# Patient Record
Sex: MALE | Race: WHITE | NOT HISPANIC OR LATINO | Employment: FULL TIME | URBAN - METROPOLITAN AREA
[De-identification: names, ages, dates, MRNs, and addresses within clinical notes are randomized per-mention and may not be internally consistent; named-entity substitution may affect disease eponyms.]

---

## 2024-01-04 ENCOUNTER — LAB VISIT (OUTPATIENT)
Dept: LAB | Facility: HOSPITAL | Age: 59
End: 2024-01-04
Attending: UROLOGY
Payer: COMMERCIAL

## 2024-01-04 ENCOUNTER — OFFICE VISIT (OUTPATIENT)
Dept: UROLOGY | Facility: CLINIC | Age: 59
End: 2024-01-04
Payer: COMMERCIAL

## 2024-01-04 VITALS
RESPIRATION RATE: 16 BRPM | DIASTOLIC BLOOD PRESSURE: 82 MMHG | SYSTOLIC BLOOD PRESSURE: 139 MMHG | TEMPERATURE: 99 F | BODY MASS INDEX: 25.81 KG/M2 | HEIGHT: 67 IN | HEART RATE: 60 BPM | WEIGHT: 164.44 LBS

## 2024-01-04 DIAGNOSIS — N40.0 BENIGN PROSTATIC HYPERPLASIA, UNSPECIFIED WHETHER LOWER URINARY TRACT SYMPTOMS PRESENT: ICD-10-CM

## 2024-01-04 DIAGNOSIS — Z30.2 STERILIZATION: Primary | ICD-10-CM

## 2024-01-04 LAB — COMPLEXED PSA SERPL-MCNC: 0.93 NG/ML (ref 0–4)

## 2024-01-04 PROCEDURE — 3075F SYST BP GE 130 - 139MM HG: CPT | Mod: CPTII,S$GLB,, | Performed by: UROLOGY

## 2024-01-04 PROCEDURE — 99999 PR PBB SHADOW E&M-NEW PATIENT-LVL III: CPT | Mod: PBBFAC,,, | Performed by: UROLOGY

## 2024-01-04 PROCEDURE — 1159F MED LIST DOCD IN RCRD: CPT | Mod: CPTII,S$GLB,, | Performed by: UROLOGY

## 2024-01-04 PROCEDURE — 3008F BODY MASS INDEX DOCD: CPT | Mod: CPTII,S$GLB,, | Performed by: UROLOGY

## 2024-01-04 PROCEDURE — 84153 ASSAY OF PSA TOTAL: CPT | Performed by: UROLOGY

## 2024-01-04 PROCEDURE — 1160F RVW MEDS BY RX/DR IN RCRD: CPT | Mod: CPTII,S$GLB,, | Performed by: UROLOGY

## 2024-01-04 PROCEDURE — 99204 OFFICE O/P NEW MOD 45 MIN: CPT | Mod: S$GLB,,, | Performed by: UROLOGY

## 2024-01-04 PROCEDURE — 3079F DIAST BP 80-89 MM HG: CPT | Mod: CPTII,S$GLB,, | Performed by: UROLOGY

## 2024-01-04 PROCEDURE — 36415 COLL VENOUS BLD VENIPUNCTURE: CPT | Mod: PN | Performed by: UROLOGY

## 2024-01-04 RX ORDER — DIAZEPAM 5 MG/1
5 TABLET ORAL ONCE
Qty: 1 TABLET | Refills: 0 | Status: SHIPPED | OUTPATIENT
Start: 2024-01-04 | End: 2024-01-04

## 2024-01-04 NOTE — PROGRESS NOTES
HPI:    Mr. Huff is a 58 y.o. male who presents for evaluation re: vasectomy. He has 4 children and he is certain that he desires no more. He is aware of other forms of contraception. He is aware that vasectomy is to be considered permanent/irreversible.    He does not get routine healthcare.  He has never had a PSA or prostate screen.  He is minimal to no lower urinary tract symptoms.    PATIENT HISTORY:    History reviewed. No pertinent past medical history.    Past Surgical History:   Procedure Laterality Date    EYE SURGERY         Family History   Problem Relation Age of Onset    Cancer Father     Heart disease Father     Hypertension Father     Cancer Mother     Heart disease Mother     Hypertension Mother     No Known Problems Maternal Aunt     No Known Problems Maternal Uncle     No Known Problems Paternal Aunt     No Known Problems Paternal Uncle     No Known Problems Paternal Grandmother     No Known Problems Paternal Grandfather     No Known Problems Maternal Grandmother     No Known Problems Maternal Grandfather     No Known Problems Other        Social History     Socioeconomic History    Marital status:    Tobacco Use    Smoking status: Never    Smokeless tobacco: Never   Substance and Sexual Activity    Alcohol use: Yes     Comment: Socially    Drug use: Never    Sexual activity: Yes     Partners: Female         Current Outpatient Medications:     diazePAM (VALIUM) 5 MG tablet, Take 1 tablet (5 mg total) by mouth once. for 1 dose, Disp: 1 tablet, Rfl: 0     Allergies:  Patient has no known allergies.    PHYSICAL EXAM:    General appearance: Well-developed, well-nourished male in no apparent distress.    Genitourinary: Bilateral testicles palpaby normal. Epididymis, vas deferens, and cord structures are normal bilaterally.    Digital rectal exam reveals a 40 g smooth prostate    IMPRESSION / PLAN:    Maxim Huff is a 58 y.o. male who presents for evaluation re: vasectomy.    I  discussed with the patient risks and benefits, as well as alternatives. We discussed possible complications at length, including fever, infection, bleeding (possibly requiring re-operation), testicular injury or atrophy with loss of function, chronic pain, persistent or recurrent presence of sperm in the ejaculate, or vasal recanalization.    We discussed that he should stop aspirin, ibuprofen, and similar products at least one week prior to the procedure. Acetaminophen is okay to use for headaches, pain, etc.    He should bring an athletic supporter and loose-fitting sweat pants on the day of the procedure.    We discussed that he must continue to use contraception until two consecutive semen analyses (checked at approximately 8 and 10 weeks post-vasectomy) reveal azoospermia.    He will schedule an elective vasectomy.

## 2024-01-18 ENCOUNTER — PROCEDURE VISIT (OUTPATIENT)
Dept: UROLOGY | Facility: CLINIC | Age: 59
End: 2024-01-18
Payer: COMMERCIAL

## 2024-01-18 VITALS
HEART RATE: 71 BPM | DIASTOLIC BLOOD PRESSURE: 92 MMHG | SYSTOLIC BLOOD PRESSURE: 161 MMHG | HEIGHT: 67 IN | BODY MASS INDEX: 26.33 KG/M2 | WEIGHT: 167.75 LBS

## 2024-01-18 DIAGNOSIS — Z30.2 STERILIZATION: Primary | ICD-10-CM

## 2024-01-18 PROCEDURE — 88302 TISSUE EXAM BY PATHOLOGIST: CPT | Performed by: PATHOLOGY

## 2024-01-18 PROCEDURE — 88302 TISSUE EXAM BY PATHOLOGIST: CPT | Mod: 26,,, | Performed by: PATHOLOGY

## 2024-01-18 PROCEDURE — 55250 REMOVAL OF SPERM DUCT(S): CPT | Mod: S$GLB,,, | Performed by: UROLOGY

## 2024-01-18 NOTE — PROCEDURES
Vasectomy    Date/Time: 1/18/2024 4:00 PM    Performed by: Abiodun Shields MD  Authorized by: Abiodun Shields MD    Anesthesia:  20 cc 1% Lidocaine  Incisions:  1  Vas:  Fulgurated and Clipped     Vasectomy Procedure Note  PreopDx. Sterilization  Post op Dx. Same  Procedure: bilateral vasectomy  EBL <5 cc  Complications : none  Specimen: 1) right vas deferens 2) left vas deferens  Anesthesia: local lidocaine 1% 10 cc  Procedure in detail:   A time-out was performed.  After being prepped and draped sterilely, patient's left vas deferens was palpated and then grasped and brought up to the skin.  The skin and subcutaneous tissues along with the vas deferens were infiltrated with 1% lidocaine.  This point a 15 blade was used to make a subcentimeter incision in the scrotal median raphe.  The vas ring was used to grasp the vas deferens.  The vas dissected was used to carefully dissect layers away from the vas deferens until it was isolated.  A 2 cm portion was isolated.  Proximal and distal titanium clips were placed.  The intervening segment was excised and sent for permanent pathology.  The ends were then cauterized with the electrocautery.  Inspection for meticulous hemostasis was made.  Minimal cautery was used for hemostasis.  I then moved to the opposite side of the table and performed a similar procedure with the right vas deferens.  Once this was completed the specimen was also obtained and sent for pathology.  The midline incision was closed with a 4-0 Chromic suture.  Bacitracin ointment and 4x4s were applied.    The patient was once again counseled on postoperative instructions and reminded to bring into postoperative semen specimens to ensure azoospermia prior to resuming sexual activity without contraception.

## 2024-01-23 LAB
FINAL PATHOLOGIC DIAGNOSIS: NORMAL
GROSS: NORMAL
Lab: NORMAL